# Patient Record
Sex: MALE | Race: WHITE | NOT HISPANIC OR LATINO | Employment: FULL TIME | URBAN - METROPOLITAN AREA
[De-identification: names, ages, dates, MRNs, and addresses within clinical notes are randomized per-mention and may not be internally consistent; named-entity substitution may affect disease eponyms.]

---

## 2023-07-10 ENCOUNTER — LAB (OUTPATIENT)
Dept: LAB | Facility: LAB | Age: 38
End: 2023-07-10
Payer: COMMERCIAL

## 2023-07-10 ENCOUNTER — OFFICE VISIT (OUTPATIENT)
Dept: PRIMARY CARE | Facility: CLINIC | Age: 38
End: 2023-07-10
Payer: COMMERCIAL

## 2023-07-10 VITALS — SYSTOLIC BLOOD PRESSURE: 112 MMHG | WEIGHT: 223 LBS | DIASTOLIC BLOOD PRESSURE: 60 MMHG | BODY MASS INDEX: 30.24 KG/M2

## 2023-07-10 DIAGNOSIS — Z00.00 WELL ADULT EXAM: Primary | ICD-10-CM

## 2023-07-10 DIAGNOSIS — Z00.00 WELL ADULT EXAM: ICD-10-CM

## 2023-07-10 DIAGNOSIS — F32.A DEPRESSION, UNSPECIFIED DEPRESSION TYPE: ICD-10-CM

## 2023-07-10 DIAGNOSIS — J45.990 EXERCISE-INDUCED ASTHMA (HHS-HCC): ICD-10-CM

## 2023-07-10 LAB
ALANINE AMINOTRANSFERASE (SGPT) (U/L) IN SER/PLAS: 33 U/L (ref 10–52)
ALBUMIN (G/DL) IN SER/PLAS: 4.7 G/DL (ref 3.4–5)
ALKALINE PHOSPHATASE (U/L) IN SER/PLAS: 69 U/L (ref 33–120)
ANION GAP IN SER/PLAS: 13 MMOL/L (ref 10–20)
ASPARTATE AMINOTRANSFERASE (SGOT) (U/L) IN SER/PLAS: 26 U/L (ref 9–39)
BILIRUBIN TOTAL (MG/DL) IN SER/PLAS: 0.9 MG/DL (ref 0–1.2)
CALCIUM (MG/DL) IN SER/PLAS: 9.5 MG/DL (ref 8.6–10.3)
CARBON DIOXIDE, TOTAL (MMOL/L) IN SER/PLAS: 29 MMOL/L (ref 21–32)
CHLORIDE (MMOL/L) IN SER/PLAS: 100 MMOL/L (ref 98–107)
CHOLESTEROL (MG/DL) IN SER/PLAS: 199 MG/DL (ref 0–199)
CHOLESTEROL IN HDL (MG/DL) IN SER/PLAS: 47.4 MG/DL
CHOLESTEROL/HDL RATIO: 4.2
CREATININE (MG/DL) IN SER/PLAS: 0.83 MG/DL (ref 0.5–1.3)
GFR MALE: >90 ML/MIN/1.73M2
GLUCOSE (MG/DL) IN SER/PLAS: 77 MG/DL (ref 74–99)
LDL: 126 MG/DL (ref 0–99)
POTASSIUM (MMOL/L) IN SER/PLAS: 4 MMOL/L (ref 3.5–5.3)
PROTEIN TOTAL: 7.4 G/DL (ref 6.4–8.2)
SODIUM (MMOL/L) IN SER/PLAS: 138 MMOL/L (ref 136–145)
TRIGLYCERIDE (MG/DL) IN SER/PLAS: 127 MG/DL (ref 0–149)
UREA NITROGEN (MG/DL) IN SER/PLAS: 14 MG/DL (ref 6–23)
VLDL: 25 MG/DL (ref 0–40)

## 2023-07-10 PROCEDURE — 36415 COLL VENOUS BLD VENIPUNCTURE: CPT

## 2023-07-10 PROCEDURE — 80053 COMPREHEN METABOLIC PANEL: CPT

## 2023-07-10 PROCEDURE — 80061 LIPID PANEL: CPT

## 2023-07-10 PROCEDURE — 1036F TOBACCO NON-USER: CPT | Performed by: FAMILY MEDICINE

## 2023-07-10 PROCEDURE — 99396 PREV VISIT EST AGE 40-64: CPT | Performed by: FAMILY MEDICINE

## 2023-07-10 RX ORDER — SERTRALINE HYDROCHLORIDE 100 MG/1
100 TABLET, FILM COATED ORAL DAILY
Qty: 90 TABLET | Refills: 3 | Status: SHIPPED | OUTPATIENT
Start: 2023-07-10

## 2023-07-10 RX ORDER — ALBUTEROL SULFATE 90 UG/1
AEROSOL, METERED RESPIRATORY (INHALATION)
COMMUNITY
Start: 2022-06-17

## 2023-07-10 RX ORDER — SERTRALINE HYDROCHLORIDE 100 MG/1
100 TABLET, FILM COATED ORAL DAILY
COMMUNITY
End: 2023-07-10 | Stop reason: SDUPTHER

## 2023-07-10 ASSESSMENT — ENCOUNTER SYMPTOMS
NEUROLOGICAL NEGATIVE: 1
GASTROINTESTINAL NEGATIVE: 1
NERVOUS/ANXIOUS: 1
CARDIOVASCULAR NEGATIVE: 1
CONSTITUTIONAL NEGATIVE: 1
RESPIRATORY NEGATIVE: 1

## 2023-07-10 ASSESSMENT — PATIENT HEALTH QUESTIONNAIRE - PHQ9
SUM OF ALL RESPONSES TO PHQ9 QUESTIONS 1 AND 2: 0
2. FEELING DOWN, DEPRESSED OR HOPELESS: NOT AT ALL
1. LITTLE INTEREST OR PLEASURE IN DOING THINGS: NOT AT ALL

## 2023-07-10 NOTE — PROGRESS NOTES
Subjective   Patient ID: Omid Rader is a 38 y.o. male who presents for Annual Exam and Med Refill.      Pt presents for annual PHE      Depression: stable, needs med refills  Asthma: stable, no refill needed now, PRN Albuterol prior to exercise    Kids 11, 12, 8   Upcoming family trip to [place]  He has been working since home since the pandemic  He is working from home   His Mom passed away last year    HM: last dental visit: May 2023  Last vision exam: it has been a few years   Vegetarian diet  Caffeine: half caff, 3 cups/day  No tobacco  Alcohol: beer occ  Exercise: running daily            Review of Systems   Constitutional: Negative.    HENT: Negative.          No seasonal allergies    Respiratory: Negative.     Cardiovascular: Negative.    Gastrointestinal: Negative.    Neurological: Negative.    Psychiatric/Behavioral:  The patient is nervous/anxious.         Increased anxiety around turning 38  Zoloft has been helpful  He has started running a mile a day        Objective   /60   Wt 101 kg (223 lb)   BMI 30.24 kg/m²     Physical Exam  Constitutional:       Appearance: Normal appearance. He is normal weight.   HENT:      Head: Normocephalic and atraumatic.      Right Ear: Tympanic membrane, ear canal and external ear normal.      Left Ear: Tympanic membrane, ear canal and external ear normal.      Mouth/Throat:      Mouth: Mucous membranes are moist.      Pharynx: Oropharynx is clear.   Eyes:      Conjunctiva/sclera: Conjunctivae normal.   Cardiovascular:      Rate and Rhythm: Normal rate and regular rhythm.      Heart sounds: Normal heart sounds.   Pulmonary:      Effort: Pulmonary effort is normal.      Breath sounds: Normal breath sounds.   Abdominal:      General: Abdomen is flat. Bowel sounds are normal.      Palpations: Abdomen is soft.   Musculoskeletal:         General: Normal range of motion.   Skin:     General: Skin is warm and dry.   Neurological:      General: No focal deficit present.       Mental Status: He is alert and oriented to person, place, and time. Mental status is at baseline.   Psychiatric:         Mood and Affect: Mood normal.         Behavior: Behavior normal.         Thought Content: Thought content normal.         Judgment: Judgment normal.         Assessment/Plan   Diagnoses and all orders for this visit:  Well adult exam  -     Comprehensive metabolic panel; Future  -     Lipid Panel; Future  Depression, unspecified depression type  -     sertraline (Zoloft) 100 mg tablet; Take 1 tablet (100 mg) by mouth once daily.  Exercise-induced asthma       Asthma is stable  Uses inhaler PRN prior to sports    hCari Bynum, DO

## 2023-07-11 ENCOUNTER — TELEPHONE (OUTPATIENT)
Dept: PRIMARY CARE | Facility: CLINIC | Age: 38
End: 2023-07-11
Payer: COMMERCIAL

## 2023-11-14 ENCOUNTER — OFFICE VISIT (OUTPATIENT)
Dept: PRIMARY CARE | Facility: CLINIC | Age: 38
End: 2023-11-14
Payer: COMMERCIAL

## 2023-11-14 VITALS
SYSTOLIC BLOOD PRESSURE: 110 MMHG | TEMPERATURE: 98.1 F | BODY MASS INDEX: 30.65 KG/M2 | DIASTOLIC BLOOD PRESSURE: 60 MMHG | WEIGHT: 226 LBS

## 2023-11-14 DIAGNOSIS — K59.00 CONSTIPATION, UNSPECIFIED CONSTIPATION TYPE: ICD-10-CM

## 2023-11-14 DIAGNOSIS — F32.A DEPRESSION, UNSPECIFIED DEPRESSION TYPE: Primary | ICD-10-CM

## 2023-11-14 PROCEDURE — 99214 OFFICE O/P EST MOD 30 MIN: CPT | Performed by: FAMILY MEDICINE

## 2023-11-14 PROCEDURE — 1036F TOBACCO NON-USER: CPT | Performed by: FAMILY MEDICINE

## 2023-11-14 RX ORDER — BUPROPION HYDROCHLORIDE 150 MG/1
150 TABLET ORAL EVERY MORNING
Qty: 30 TABLET | Refills: 1 | Status: SHIPPED | OUTPATIENT
Start: 2023-11-14 | End: 2024-05-12

## 2023-11-14 NOTE — PROGRESS NOTES
Subjective   Patient ID: Omid Rader is a 38 y.o. male who presents for Discuss medication.    Pt presents for follow up:   Is unsure if the Zoloft is working  Has some lower abd pain  Has some sexual side effects  A few recent family deaths   Feels increased stress  Is exercising, playing basketball          Review of Systems    Objective   /60 (BP Location: Right arm, Patient Position: Sitting)   Temp 36.7 °C (98.1 °F)   Wt 103 kg (226 lb)   BMI 30.65 kg/m²     Physical Exam  Constitutional:       Appearance: Normal appearance.   Cardiovascular:      Rate and Rhythm: Normal rate and regular rhythm.      Heart sounds: Normal heart sounds.   Pulmonary:      Effort: Pulmonary effort is normal.      Breath sounds: Normal breath sounds.   Abdominal:      General: Abdomen is flat.      Palpations: Abdomen is soft.   Neurological:      Mental Status: He is alert.         Assessment/Plan   Diagnoses and all orders for this visit:  Depression, unspecified depression type  -     buPROPion XL (Wellbutrin XL) 150 mg 24 hr tablet; Take 1 tablet (150 mg) by mouth once daily in the morning. Do not crush, chew, or split.  Constipation, unspecified constipation type    Taper off the Zoloft, 50 mg po daily x 1-2 weeks, then 25 mg po q daily x 1-2 weeks  Start Wellbutrin  Encouraged regular exercise   Add fiber for constipation  Send a [employer] message in 2-3 weeks and let us know how you are feeling   Chari Bynum, DO

## 2023-12-29 DIAGNOSIS — F32.A DEPRESSION, UNSPECIFIED DEPRESSION TYPE: ICD-10-CM

## 2023-12-29 RX ORDER — BUPROPION HYDROCHLORIDE 150 MG/1
150 TABLET ORAL EVERY MORNING
Qty: 30 TABLET | Refills: 1 | OUTPATIENT
Start: 2023-12-29 | End: 2024-06-26

## 2024-07-26 ENCOUNTER — APPOINTMENT (OUTPATIENT)
Dept: PRIMARY CARE | Facility: CLINIC | Age: 39
End: 2024-07-26
Payer: COMMERCIAL

## 2024-07-26 VITALS
BODY MASS INDEX: 28.71 KG/M2 | HEIGHT: 72 IN | SYSTOLIC BLOOD PRESSURE: 120 MMHG | HEART RATE: 68 BPM | WEIGHT: 212 LBS | DIASTOLIC BLOOD PRESSURE: 80 MMHG | OXYGEN SATURATION: 98 %

## 2024-07-26 DIAGNOSIS — N52.2 DRUG-INDUCED ERECTILE DYSFUNCTION: ICD-10-CM

## 2024-07-26 DIAGNOSIS — J45.990 EXERCISE-INDUCED ASTHMA (HHS-HCC): Primary | ICD-10-CM

## 2024-07-26 DIAGNOSIS — Z00.00 ANNUAL PHYSICAL EXAM: ICD-10-CM

## 2024-07-26 LAB
ALBUMIN SERPL BCP-MCNC: 4.7 G/DL (ref 3.4–5)
ALP SERPL-CCNC: 72 U/L (ref 33–120)
ALT SERPL W P-5'-P-CCNC: 26 U/L (ref 10–52)
ANION GAP SERPL CALC-SCNC: 14 MMOL/L (ref 10–20)
AST SERPL W P-5'-P-CCNC: 23 U/L (ref 9–39)
BILIRUB SERPL-MCNC: 0.8 MG/DL (ref 0–1.2)
BUN SERPL-MCNC: 11 MG/DL (ref 6–23)
CALCIUM SERPL-MCNC: 9.7 MG/DL (ref 8.6–10.6)
CHLORIDE SERPL-SCNC: 103 MMOL/L (ref 98–107)
CHOLEST SERPL-MCNC: 163 MG/DL (ref 0–199)
CHOLESTEROL/HDL RATIO: 3.4
CO2 SERPL-SCNC: 29 MMOL/L (ref 21–32)
CREAT SERPL-MCNC: 0.96 MG/DL (ref 0.5–1.3)
EGFRCR SERPLBLD CKD-EPI 2021: >90 ML/MIN/1.73M*2
ERYTHROCYTE [DISTWIDTH] IN BLOOD BY AUTOMATED COUNT: 12.6 % (ref 11.5–14.5)
EST. AVERAGE GLUCOSE BLD GHB EST-MCNC: 100 MG/DL
GLUCOSE SERPL-MCNC: 80 MG/DL (ref 74–99)
HBA1C MFR BLD: 5.1 %
HCT VFR BLD AUTO: 48.3 % (ref 41–52)
HDLC SERPL-MCNC: 47.4 MG/DL
HGB BLD-MCNC: 15.5 G/DL (ref 13.5–17.5)
LDLC SERPL CALC-MCNC: 95 MG/DL
MCH RBC QN AUTO: 30.9 PG (ref 26–34)
MCHC RBC AUTO-ENTMCNC: 32.1 G/DL (ref 32–36)
MCV RBC AUTO: 96 FL (ref 80–100)
NON HDL CHOLESTEROL: 116 MG/DL (ref 0–149)
NRBC BLD-RTO: 0 /100 WBCS (ref 0–0)
PLATELET # BLD AUTO: 240 X10*3/UL (ref 150–450)
POTASSIUM SERPL-SCNC: 4.6 MMOL/L (ref 3.5–5.3)
PROT SERPL-MCNC: 7 G/DL (ref 6.4–8.2)
PSA SERPL-MCNC: 0.39 NG/ML
RBC # BLD AUTO: 5.02 X10*6/UL (ref 4.5–5.9)
SODIUM SERPL-SCNC: 141 MMOL/L (ref 136–145)
T3 SERPL-MCNC: 106 NG/DL (ref 60–200)
TRIGL SERPL-MCNC: 102 MG/DL (ref 0–149)
VLDL: 20 MG/DL (ref 0–40)
WBC # BLD AUTO: 5.9 X10*3/UL (ref 4.4–11.3)

## 2024-07-26 PROCEDURE — 84480 ASSAY TRIIODOTHYRONINE (T3): CPT

## 2024-07-26 PROCEDURE — 3008F BODY MASS INDEX DOCD: CPT | Performed by: NURSE PRACTITIONER

## 2024-07-26 PROCEDURE — 99396 PREV VISIT EST AGE 40-64: CPT | Performed by: NURSE PRACTITIONER

## 2024-07-26 PROCEDURE — 85027 COMPLETE CBC AUTOMATED: CPT

## 2024-07-26 PROCEDURE — 1036F TOBACCO NON-USER: CPT | Performed by: NURSE PRACTITIONER

## 2024-07-26 PROCEDURE — 36415 COLL VENOUS BLD VENIPUNCTURE: CPT

## 2024-07-26 PROCEDURE — 83036 HEMOGLOBIN GLYCOSYLATED A1C: CPT

## 2024-07-26 PROCEDURE — 80061 LIPID PANEL: CPT

## 2024-07-26 PROCEDURE — 84402 ASSAY OF FREE TESTOSTERONE: CPT

## 2024-07-26 PROCEDURE — 80053 COMPREHEN METABOLIC PANEL: CPT

## 2024-07-26 PROCEDURE — 84153 ASSAY OF PSA TOTAL: CPT

## 2024-07-26 RX ORDER — ALBUTEROL SULFATE 90 UG/1
4 AEROSOL, METERED RESPIRATORY (INHALATION) EVERY 4 HOURS PRN
Qty: 18 G | Refills: 11 | Status: SHIPPED | OUTPATIENT
Start: 2024-07-26 | End: 2025-07-26

## 2024-07-26 RX ORDER — ESCITALOPRAM OXALATE 10 MG/1
10 TABLET ORAL
COMMUNITY
Start: 2024-06-28

## 2024-07-26 RX ORDER — SILDENAFIL 50 MG/1
50 TABLET, FILM COATED ORAL DAILY PRN
Qty: 12 TABLET | Refills: 3 | Status: SHIPPED | OUTPATIENT
Start: 2024-07-26 | End: 2025-07-26

## 2024-07-26 ASSESSMENT — ENCOUNTER SYMPTOMS
HEMATOLOGIC/LYMPHATIC NEGATIVE: 1
CONFUSION: 0
CONSTITUTIONAL NEGATIVE: 1
CARDIOVASCULAR NEGATIVE: 1
PSYCHIATRIC NEGATIVE: 1
NERVOUS/ANXIOUS: 0
WOUND: 0
ENDOCRINE NEGATIVE: 1
LIGHT-HEADEDNESS: 0
EYES NEGATIVE: 1
DIZZINESS: 0
ALLERGIC/IMMUNOLOGIC NEGATIVE: 1
NEUROLOGICAL NEGATIVE: 1
GASTROINTESTINAL NEGATIVE: 1
MUSCULOSKELETAL NEGATIVE: 1
WEAKNESS: 0
POLYPHAGIA: 0
SLEEP DISTURBANCE: 0
RESPIRATORY NEGATIVE: 1
FACIAL ASYMMETRY: 0
DEPRESSION: 0

## 2024-07-26 NOTE — PROGRESS NOTES
Subjective   Patient ID: Omid Rader is a 39 y.o. male who presents for Establish Care.    HPI   Omid is a 40 y/o male here to b care.   Pt presents for annual PHE   Injured left rib wall playing basketball yesterday  -- > no SOB or CP noted no cough     Depression: stable,  on lexparo and Wellbutrin ( seeing Dr. Sotelo)   Asthma: stable,  PRN Albuterol prior to exercise  Would like to to start medications to help with ED       Past family hx:   HTN, low BP  PATERNAL GRANDFATHER: [place]   Mom: passed away from cancer 2 yrs ago lymphoma ( life long smoker)     Eating drinking ok   Moving bowel and bladder ok Goes to dentist every 6 months   Several years since eye exam   Vegetarian diet  Caffeine: half caff, 3 cups/day  No tobacco  Alcohol: beer occ  Exercise daily        Review of Systems   Constitutional: Negative.    HENT: Negative.     Eyes: Negative.    Respiratory: Negative.     Cardiovascular: Negative.    Gastrointestinal: Negative.    Endocrine: Negative.  Negative for polyphagia.   Genitourinary: Negative.    Musculoskeletal: Negative.    Skin: Negative.  Negative for pallor, rash and wound.   Allergic/Immunologic: Negative.    Neurological: Negative.  Negative for dizziness, facial asymmetry, weakness and light-headedness.   Hematological: Negative.    Psychiatric/Behavioral: Negative.  Negative for confusion, sleep disturbance and suicidal ideas. The patient is not nervous/anxious.    All other systems reviewed and are negative.      Objective   /80   Pulse 68   Ht 1.829 m (6')   Wt 96.2 kg (212 lb)   SpO2 98%   BMI 28.75 kg/m²     Physical Exam  Vitals and nursing note reviewed.   Constitutional:       Appearance: Normal appearance. He is normal weight.   HENT:      Head: Normocephalic.      Nose: Nose normal.      Mouth/Throat:      Mouth: Mucous membranes are moist.   Eyes:      Extraocular Movements: Extraocular movements intact.      Conjunctiva/sclera: Conjunctivae normal.      Pupils:  Pupils are equal, round, and reactive to light.   Cardiovascular:      Rate and Rhythm: Normal rate and regular rhythm.      Pulses: Normal pulses.      Heart sounds: Normal heart sounds.   Pulmonary:      Effort: Pulmonary effort is normal.   Abdominal:      General: Abdomen is flat. Bowel sounds are normal.      Palpations: Abdomen is soft.   Musculoskeletal:         General: Normal range of motion.      Cervical back: Normal range of motion.   Skin:     General: Skin is warm and dry.   Neurological:      General: No focal deficit present.      Mental Status: He is alert and oriented to person, place, and time.   Psychiatric:         Mood and Affect: Mood normal.         Behavior: Behavior normal.         Assessment/Plan   1. Exercise-induced asthma ([facility])    - albuterol (Ventolin HFA) 90 mcg/actuation inhaler; Inhale 4 puffs every 4 hours if needed for wheezing.  Dispense: 18 g; Refill: 11    2. Annual physical exam  - Comprehensive Metabolic Panel  - CBC  - Prostate Specific Antigen, Screen  - TSH with reflex to Free T4 if abnormal; Future  - Triiodothyronine, Total  - Lipid Panel  - Hemoglobin A1C    3. Drug-induced erectile dysfunction  - Testosterone, total and free  - Testosterone  - sildenafil (Viagra) 50 mg tablet; Take 1 tablet (50 mg) by mouth once daily as needed for erectile dysfunction.  Dispense: 12 tablet; Refill: 3       Yearly exams or sooner if problems arise     Of note, has been instructed to go to er for worsening CP or SOB to rib injury

## 2024-07-26 NOTE — PATIENT INSTRUCTIONS
Pleasure taking care of you Today!   Please feel free reach out with any questions or concerns!    Kamla Gusman, CNP

## 2024-08-03 LAB
TESTOSTERONE FREE (CHAN): 49.7 PG/ML (ref 35–155)
TESTOSTERONE,TOTAL,LC-MS/MS: 391 NG/DL (ref 250–1100)

## 2024-12-26 DIAGNOSIS — F32.A DEPRESSION, UNSPECIFIED DEPRESSION TYPE: ICD-10-CM

## 2024-12-26 RX ORDER — BUPROPION HYDROCHLORIDE 150 MG/1
150 TABLET ORAL EVERY MORNING
Qty: 90 TABLET | Refills: 3 | Status: SHIPPED | OUTPATIENT
Start: 2024-12-26 | End: 2025-12-26

## 2024-12-26 RX ORDER — ESCITALOPRAM OXALATE 10 MG/1
10 TABLET ORAL
Qty: 90 TABLET | Refills: 3 | Status: SHIPPED | OUTPATIENT
Start: 2024-12-26 | End: 2025-12-26

## 2025-05-17 DIAGNOSIS — N52.2 DRUG-INDUCED ERECTILE DYSFUNCTION: ICD-10-CM

## 2025-05-19 RX ORDER — SILDENAFIL 50 MG/1
50 TABLET, FILM COATED ORAL DAILY PRN
Qty: 12 TABLET | Refills: 3 | Status: SHIPPED | OUTPATIENT
Start: 2025-05-19 | End: 2026-05-19